# Patient Record
Sex: MALE | Race: WHITE | Employment: OTHER | URBAN - METROPOLITAN AREA
[De-identification: names, ages, dates, MRNs, and addresses within clinical notes are randomized per-mention and may not be internally consistent; named-entity substitution may affect disease eponyms.]

---

## 2017-01-19 ENCOUNTER — RT VISIT (OUTPATIENT)
Dept: RESPIRATORY THERAPY | Facility: HOSPITAL | Age: 67
End: 2017-01-19
Attending: INTERNAL MEDICINE
Payer: MEDICARE

## 2017-01-19 DIAGNOSIS — R06.00 DYSPNEA: ICD-10-CM

## 2017-01-19 DIAGNOSIS — R06.00 DYSPNEA, UNSPECIFIED TYPE: ICD-10-CM

## 2017-01-19 PROCEDURE — 94070 EVALUATION OF WHEEZING: CPT

## 2017-01-20 NOTE — PROCEDURES
Max Faulkner is a 43-year-old  male who stands 5 feet 5 inches tall and weighs 185 pounds. He underwent methacholine challenge testing on 1/19/17. He carries a diagnosis of dyspnea.   He admits to a 7-pack-year smoking history but stopped smoking 30

## 2017-01-25 PROBLEM — J45.909 ASTHMA: Status: ACTIVE | Noted: 2017-01-25

## 2017-01-25 NOTE — PROGRESS NOTES
Quick Note:    Spoke with patient, relayed results and recommendations per Dr Oneil Salmon, patient verbalized understanding. Patient is currently using Albuterol three times a day.  Instructed patient to increase Advair 500-50 1 puff BID, patient requested p

## 2017-01-25 NOTE — PROGRESS NOTES
Quick Note:    Please let pt know that his breathing test confirmed that he has Asthma. He should continue the Advair, Albuterol, and Singulair as he has been.   If he notes more frequent use of the Albuterol than 2-3 times a week he should call us to incr

## 2017-04-25 PROCEDURE — 86003 ALLG SPEC IGE CRUDE XTRC EA: CPT | Performed by: INTERNAL MEDICINE

## 2017-04-25 PROCEDURE — 36415 COLL VENOUS BLD VENIPUNCTURE: CPT | Performed by: INTERNAL MEDICINE

## 2017-08-30 PROBLEM — Z87.09 HISTORY OF CHRONIC SINUSITIS: Status: ACTIVE | Noted: 2017-08-30

## 2017-08-30 PROBLEM — J45.50: Status: ACTIVE | Noted: 2017-08-30

## 2017-11-28 PROBLEM — S93.491A SPRAIN OF OTHER LIGAMENT OF RIGHT ANKLE: Status: ACTIVE | Noted: 2017-11-28

## 2017-11-30 PROBLEM — M97.8XXA PERIPROSTHETIC FRACTURE OF PROXIMAL END OF FEMUR: Status: ACTIVE | Noted: 2017-11-30

## 2017-11-30 PROBLEM — Z96.649 PERIPROSTHETIC FRACTURE OF PROXIMAL END OF FEMUR: Status: ACTIVE | Noted: 2017-11-30

## 2017-12-05 PROBLEM — M87.051 AVASCULAR NECROSIS OF MEDIAL CONDYLE OF RIGHT FEMUR (HCC): Status: ACTIVE | Noted: 2017-12-05

## 2018-07-12 PROBLEM — J45.50: Status: RESOLVED | Noted: 2017-08-30 | Resolved: 2018-07-12

## 2018-07-12 PROBLEM — J45.50 SEVERE PERSISTENT ASTHMA WITHOUT COMPLICATION: Status: ACTIVE | Noted: 2018-07-12

## 2018-07-12 PROBLEM — J82.83 EOSINOPHILIC ASTHMA: Status: ACTIVE | Noted: 2018-07-12

## 2018-12-21 PROBLEM — J47.9 BRONCHIECTASIS WITHOUT COMPLICATION (HCC): Status: ACTIVE | Noted: 2018-12-21

## 2019-01-18 PROBLEM — Z96.649 PERIPROSTHETIC FRACTURE OF PROXIMAL END OF FEMUR: Status: RESOLVED | Noted: 2017-11-30 | Resolved: 2019-01-18

## 2019-01-18 PROBLEM — M97.8XXA PERIPROSTHETIC FRACTURE OF PROXIMAL END OF FEMUR: Status: RESOLVED | Noted: 2017-11-30 | Resolved: 2019-01-18

## 2019-10-17 ENCOUNTER — APPOINTMENT (OUTPATIENT)
Dept: MRI IMAGING | Facility: HOSPITAL | Age: 69
DRG: 519 | End: 2019-10-17
Attending: EMERGENCY MEDICINE
Payer: MEDICARE

## 2019-10-17 ENCOUNTER — HOSPITAL ENCOUNTER (INPATIENT)
Facility: HOSPITAL | Age: 69
LOS: 9 days | Discharge: NURSING, REHAB OR LONG-TERM CARE | DRG: 519 | End: 2019-10-26
Attending: EMERGENCY MEDICINE | Admitting: INTERNAL MEDICINE
Payer: MEDICARE

## 2019-10-17 ENCOUNTER — APPOINTMENT (OUTPATIENT)
Dept: GENERAL RADIOLOGY | Facility: HOSPITAL | Age: 69
DRG: 519 | End: 2019-10-17
Attending: EMERGENCY MEDICINE
Payer: MEDICARE

## 2019-10-17 DIAGNOSIS — S32.020A COMPRESSION FRACTURE OF L2 VERTEBRA, INITIAL ENCOUNTER (HCC): Primary | ICD-10-CM

## 2019-10-17 DIAGNOSIS — S32.030A COMPRESSION FRACTURE OF L3 VERTEBRA, INITIAL ENCOUNTER (HCC): ICD-10-CM

## 2019-10-17 PROCEDURE — 94640 AIRWAY INHALATION TREATMENT: CPT

## 2019-10-17 PROCEDURE — 96374 THER/PROPH/DIAG INJ IV PUSH: CPT

## 2019-10-17 PROCEDURE — 87999 UNLISTED MICROBIOLOGY PX: CPT

## 2019-10-17 PROCEDURE — 71045 X-RAY EXAM CHEST 1 VIEW: CPT | Performed by: EMERGENCY MEDICINE

## 2019-10-17 PROCEDURE — 96361 HYDRATE IV INFUSION ADD-ON: CPT

## 2019-10-17 PROCEDURE — 96375 TX/PRO/DX INJ NEW DRUG ADDON: CPT

## 2019-10-17 PROCEDURE — 93010 ELECTROCARDIOGRAM REPORT: CPT

## 2019-10-17 PROCEDURE — 85025 COMPLETE CBC W/AUTO DIFF WBC: CPT | Performed by: EMERGENCY MEDICINE

## 2019-10-17 PROCEDURE — 80053 COMPREHEN METABOLIC PANEL: CPT | Performed by: EMERGENCY MEDICINE

## 2019-10-17 PROCEDURE — 99285 EMERGENCY DEPT VISIT HI MDM: CPT

## 2019-10-17 PROCEDURE — 72148 MRI LUMBAR SPINE W/O DYE: CPT | Performed by: EMERGENCY MEDICINE

## 2019-10-17 PROCEDURE — 93005 ELECTROCARDIOGRAM TRACING: CPT

## 2019-10-17 PROCEDURE — 96376 TX/PRO/DX INJ SAME DRUG ADON: CPT

## 2019-10-17 RX ORDER — HYDROCODONE BITARTRATE AND ACETAMINOPHEN 5; 325 MG/1; MG/1
2 TABLET ORAL EVERY 4 HOURS PRN
Status: DISCONTINUED | OUTPATIENT
Start: 2019-10-17 | End: 2019-10-22

## 2019-10-17 RX ORDER — IPRATROPIUM BROMIDE AND ALBUTEROL SULFATE 2.5; .5 MG/3ML; MG/3ML
3 SOLUTION RESPIRATORY (INHALATION)
Status: DISCONTINUED | OUTPATIENT
Start: 2019-10-17 | End: 2019-10-23

## 2019-10-17 RX ORDER — MONTELUKAST SODIUM 10 MG/1
10 TABLET ORAL NIGHTLY
Status: DISCONTINUED | OUTPATIENT
Start: 2019-10-17 | End: 2019-10-26

## 2019-10-17 RX ORDER — MONTELUKAST SODIUM 10 MG/1
10 TABLET ORAL NIGHTLY
COMMUNITY

## 2019-10-17 RX ORDER — MULTIVITAMIN WITH FOLIC ACID 400 MCG
1 TABLET ORAL DAILY
COMMUNITY

## 2019-10-17 RX ORDER — ACETAMINOPHEN 325 MG/1
650 TABLET ORAL EVERY 4 HOURS PRN
Status: DISCONTINUED | OUTPATIENT
Start: 2019-10-17 | End: 2019-10-26

## 2019-10-17 RX ORDER — ONDANSETRON 2 MG/ML
4 INJECTION INTRAMUSCULAR; INTRAVENOUS ONCE
Status: COMPLETED | OUTPATIENT
Start: 2019-10-17 | End: 2019-10-17

## 2019-10-17 RX ORDER — ALBUTEROL SULFATE 90 UG/1
2 AEROSOL, METERED RESPIRATORY (INHALATION) EVERY 6 HOURS PRN
Status: DISCONTINUED | OUTPATIENT
Start: 2019-10-17 | End: 2019-10-26

## 2019-10-17 RX ORDER — IPRATROPIUM BROMIDE AND ALBUTEROL SULFATE 2.5; .5 MG/3ML; MG/3ML
3 SOLUTION RESPIRATORY (INHALATION) ONCE
Status: COMPLETED | OUTPATIENT
Start: 2019-10-17 | End: 2019-10-17

## 2019-10-17 RX ORDER — HYDROMORPHONE HYDROCHLORIDE 1 MG/ML
0.5 INJECTION, SOLUTION INTRAMUSCULAR; INTRAVENOUS; SUBCUTANEOUS EVERY 30 MIN PRN
Status: ACTIVE | OUTPATIENT
Start: 2019-10-17 | End: 2019-10-17

## 2019-10-17 RX ORDER — HYDROMORPHONE HYDROCHLORIDE 1 MG/ML
1 INJECTION, SOLUTION INTRAMUSCULAR; INTRAVENOUS; SUBCUTANEOUS ONCE
Status: COMPLETED | OUTPATIENT
Start: 2019-10-17 | End: 2019-10-17

## 2019-10-17 RX ORDER — METOCLOPRAMIDE HYDROCHLORIDE 5 MG/ML
10 INJECTION INTRAMUSCULAR; INTRAVENOUS EVERY 8 HOURS PRN
Status: DISCONTINUED | OUTPATIENT
Start: 2019-10-17 | End: 2019-10-26

## 2019-10-17 RX ORDER — NALOXONE HYDROCHLORIDE 0.4 MG/ML
0.08 INJECTION, SOLUTION INTRAMUSCULAR; INTRAVENOUS; SUBCUTANEOUS
Status: DISCONTINUED | OUTPATIENT
Start: 2019-10-17 | End: 2019-10-26

## 2019-10-17 RX ORDER — POTASSIUM CHLORIDE 20 MEQ/1
40 TABLET, EXTENDED RELEASE ORAL EVERY 4 HOURS
Status: COMPLETED | OUTPATIENT
Start: 2019-10-17 | End: 2019-10-18

## 2019-10-17 RX ORDER — HYDROMORPHONE HYDROCHLORIDE 1 MG/ML
0.4 INJECTION, SOLUTION INTRAMUSCULAR; INTRAVENOUS; SUBCUTANEOUS EVERY 2 HOUR PRN
Status: DISCONTINUED | OUTPATIENT
Start: 2019-10-17 | End: 2019-10-22

## 2019-10-17 RX ORDER — FEXOFENADINE HCL 180 MG/1
180 TABLET ORAL 2 TIMES DAILY
COMMUNITY

## 2019-10-17 RX ORDER — ONDANSETRON 2 MG/ML
4 INJECTION INTRAMUSCULAR; INTRAVENOUS EVERY 4 HOURS PRN
Status: DISCONTINUED | OUTPATIENT
Start: 2019-10-17 | End: 2019-10-23

## 2019-10-17 RX ORDER — HYDROMORPHONE HYDROCHLORIDE 1 MG/ML
1.2 INJECTION, SOLUTION INTRAMUSCULAR; INTRAVENOUS; SUBCUTANEOUS EVERY 2 HOUR PRN
Status: DISCONTINUED | OUTPATIENT
Start: 2019-10-17 | End: 2019-10-22

## 2019-10-17 RX ORDER — SODIUM CHLORIDE 9 MG/ML
INJECTION, SOLUTION INTRAVENOUS ONCE
Status: COMPLETED | OUTPATIENT
Start: 2019-10-17 | End: 2019-10-17

## 2019-10-17 RX ORDER — HEPARIN SODIUM 5000 [USP'U]/ML
5000 INJECTION, SOLUTION INTRAVENOUS; SUBCUTANEOUS EVERY 8 HOURS SCHEDULED
Status: DISCONTINUED | OUTPATIENT
Start: 2019-10-17 | End: 2019-10-22

## 2019-10-17 RX ORDER — SODIUM CHLORIDE 9 MG/ML
INJECTION, SOLUTION INTRAVENOUS CONTINUOUS
Status: ACTIVE | OUTPATIENT
Start: 2019-10-17 | End: 2019-10-17

## 2019-10-17 RX ORDER — FLUTICASONE PROPIONATE 50 MCG
2 SPRAY, SUSPENSION (ML) NASAL DAILY
Status: DISCONTINUED | OUTPATIENT
Start: 2019-10-18 | End: 2019-10-26

## 2019-10-17 RX ORDER — ONDANSETRON 2 MG/ML
4 INJECTION INTRAMUSCULAR; INTRAVENOUS EVERY 6 HOURS PRN
Status: DISCONTINUED | OUTPATIENT
Start: 2019-10-17 | End: 2019-10-26

## 2019-10-17 RX ORDER — ALBUTEROL SULFATE 2.5 MG/3ML
2.5 SOLUTION RESPIRATORY (INHALATION) DAILY PRN
Status: DISCONTINUED | OUTPATIENT
Start: 2019-10-17 | End: 2019-10-24

## 2019-10-17 RX ORDER — SENNOSIDES 8.6 MG
8.6 TABLET ORAL DAILY
Status: DISCONTINUED | OUTPATIENT
Start: 2019-10-17 | End: 2019-10-26

## 2019-10-17 RX ORDER — HYDROCODONE BITARTRATE AND ACETAMINOPHEN 5; 325 MG/1; MG/1
1 TABLET ORAL EVERY 4 HOURS PRN
Status: DISCONTINUED | OUTPATIENT
Start: 2019-10-17 | End: 2019-10-22

## 2019-10-17 RX ORDER — GUAIFENESIN AND DEXTROMETHORPHAN HYDROBROMIDE 1200; 60 MG/1; MG/1
1 TABLET, EXTENDED RELEASE ORAL EVERY 12 HOURS
COMMUNITY

## 2019-10-17 RX ORDER — SODIUM CHLORIDE 30 MG/ML INHALATION SOLUTION 30 MG/ML
4 SOLUTION INHALANT EVERY 12 HOURS
Status: DISCONTINUED | OUTPATIENT
Start: 2019-10-17 | End: 2019-10-20

## 2019-10-17 RX ORDER — DIPHENHYDRAMINE HCL 25 MG
25 CAPSULE ORAL EVERY 6 HOURS PRN
Status: DISCONTINUED | OUTPATIENT
Start: 2019-10-17 | End: 2019-10-23

## 2019-10-17 RX ORDER — FAMOTIDINE 20 MG/1
20 TABLET ORAL 2 TIMES DAILY
Status: DISCONTINUED | OUTPATIENT
Start: 2019-10-17 | End: 2019-10-26

## 2019-10-17 RX ORDER — HYDROMORPHONE HYDROCHLORIDE 1 MG/ML
1 INJECTION, SOLUTION INTRAMUSCULAR; INTRAVENOUS; SUBCUTANEOUS ONCE
Status: DISCONTINUED | OUTPATIENT
Start: 2019-10-17 | End: 2019-10-17

## 2019-10-17 RX ORDER — IPRATROPIUM BROMIDE AND ALBUTEROL SULFATE 2.5; .5 MG/3ML; MG/3ML
3 SOLUTION RESPIRATORY (INHALATION) EVERY 6 HOURS PRN
Status: DISCONTINUED | OUTPATIENT
Start: 2019-10-17 | End: 2019-10-26

## 2019-10-17 RX ORDER — FLUTICASONE PROPIONATE 50 MCG
1 SPRAY, SUSPENSION (ML) NASAL DAILY
Status: DISCONTINUED | OUTPATIENT
Start: 2019-10-18 | End: 2019-10-26

## 2019-10-17 RX ORDER — HYDROMORPHONE HYDROCHLORIDE 1 MG/ML
0.8 INJECTION, SOLUTION INTRAMUSCULAR; INTRAVENOUS; SUBCUTANEOUS EVERY 2 HOUR PRN
Status: DISCONTINUED | OUTPATIENT
Start: 2019-10-17 | End: 2019-10-22

## 2019-10-17 RX ORDER — CETIRIZINE HYDROCHLORIDE 10 MG/1
10 TABLET ORAL DAILY
Status: ON HOLD | COMMUNITY
End: 2019-10-17

## 2019-10-17 NOTE — ED INITIAL ASSESSMENT (HPI)
Pt states a fall x 2 days prior. Pt had xrays and confirms fx to lumbar area. Pt states came today due to pain to right hip.

## 2019-10-17 NOTE — H&P
[facility] Hospitalist Team  History and Physical       Assessment/Plan:     #Lumbar pain   -2/2 fall  -Xr shows mod/severe compression fx  -MRI is pending, consult spine once resulted  -pain control with iv dilaudid  -PT/OT eval    #Asthma/bronchiectasis  -cont Chantelle Francis MD at [address]        ALL:    Iodine (Topical)        ANAPHYLAXIS  Prednisone              OTHER (SEE COMMENTS)    Comment:Avascular necrosis  Radiology Contrast *    HIVES     Montelukast Sodium 10 MG Oral Tab, Take 10 mg by mouth nightly. Hx  Family History   Problem Relation Age of Onset   • Heart Attack Father         tobacco - 2 cartons weekly   • Other (Other) Mother         osteoporosis   • Cancer Brother         colon       Review of Systems   CONSTITUTIONAL:  As per HPI.   HEENT:  Eye 10/17/2019    CREATSERUM 0.97 10/17/2019    BUN 19 10/17/2019     10/17/2019    K 3.6 10/17/2019     10/17/2019    CO2 32.0 10/17/2019     10/17/2019    CA 9.6 10/17/2019    ALB 3.5 10/17/2019    ALKPHO 78 10/17/2019    BILT 1.0 10/17/20 be determined at this time. However, given patient's history of fall with subsequent low back pain, an acute or otherwise recent process is a likely consideration. Consider correlation with cross-sectional imaging study for further characterization.  No oth

## 2019-10-17 NOTE — ED PROVIDER NOTES
Patient Seen in: [facility] Emergency Department      History   Patient presents with:  Fall (musculoskeletal, neurologic)  Pain (neurologic)    Stated Complaint: Back pain, sent from Spine MD office    HPI    Patient is a 50-year-old male who prese History    Tobacco Use      Smoking status: Former Smoker        Packs/day: 1.50        Years: 7.00        Pack years: 10.5        Types: Cigarettes        Start date: 1967        Quit date: 1973        Years since quittin.7      Smokeless t (*)     HCT 56.0 (*)     RDW-SD 49.1 (*)     Neutrophil Absolute Prelim 9.12 (*)     Neutrophil Absolute 9.12 (*)     Lymphocyte Absolute 0.92 (*)     Monocyte Absolute 1.40 (*)     Eosinophil Absolute 1.00 (*)     All other components within normal limits Plan     Clinical Impression:  Compression fracture of L2 vertebra, initial encounter ([facility] [address]  (primary encounter diagnosis)    Disposition:  There is no disposition on file for this visit. There is no disposition time on file for this visit.     Follow-up:

## 2019-10-17 NOTE — ED NOTES
Patient states unable to sit in wheelchair for any period of time. Placed in Triage Holding area awaiting Triage Assessment. Patient's wife requested water from volunteer so she may give him pain meds.  Patient and wife informed that the patient needs to se

## 2019-10-18 PROCEDURE — 94640 AIRWAY INHALATION TREATMENT: CPT

## 2019-10-18 PROCEDURE — 84132 ASSAY OF SERUM POTASSIUM: CPT | Performed by: INTERNAL MEDICINE

## 2019-10-18 RX ORDER — CALCIUM CARBONATE 200(500)MG
500 TABLET,CHEWABLE ORAL EVERY 6 HOURS PRN
Status: DISCONTINUED | OUTPATIENT
Start: 2019-10-18 | End: 2019-10-26

## 2019-10-18 RX ORDER — GUAIFENESIN 600 MG
600 TABLET, EXTENDED RELEASE 12 HR ORAL EVERY 6 HOURS
Status: DISCONTINUED | OUTPATIENT
Start: 2019-10-18 | End: 2019-10-25

## 2019-10-18 NOTE — PLAN OF CARE
Spoke with Dr Miguel A Lee who said to put interventionalist on consult for kyphoplasty. Dr Denney Primrose. Will follow up.

## 2019-10-18 NOTE — PROGRESS NOTES
NURSING ADMISSION NOTE      Patient admitted via ER bed. Oriented to room. Safety precautions initiated. Bed in low position. Call light in reach. Admission database complete.

## 2019-10-18 NOTE — PROGRESS NOTES
NURSING ADMISSION NOTE      Patient admitted via cart at 0. Oriented to room. Safety precautions initiated. Bed in low position. Call light in reach. Patient admitted with pain to right hip s/p fall with L2 compression fx.   ED spoke with

## 2019-10-18 NOTE — PROGRESS NOTES
10/18/19 0803   Clinical Encounter Type   Visited With Patient and family together;Health care provider  (RN [facility])   Routine Visit   (Responded to POLST consult)   Continue Visiting   (Empowered the patient to call  anytime for further suppo

## 2019-10-18 NOTE — PROGRESS NOTES
[facility] Hospitalist Team  Progress Note      Aly Maki  12/6/1950    Assessment/Plan:       #Lumbar pain   -2/2 fall  -Xr shows mod/severe compression fx  -MRI shows acute F2 fracture with minimal retropulsion  -consult spine rec eval for kyphoplasty discuss Transdermal Nightly   • Fluticasone Propionate  1 spray Each Nare Daily   • Montelukast Sodium  10 mg Oral Nightly   • sodium chloride  4 mL Nebulization Q12H   • Umeclidinium Bromide  1 puff Inhalation Daily   • Fluticasone Propionate  2 spray Each Nare D

## 2019-10-18 NOTE — PHYSICAL THERAPY NOTE
PT order received, chart reviewed. Pt with L2 fx, pending ortho spine consult and possible intervention. Per chart, pt with severe pain with all weight bearing. Will hold PT eval under medical POC progressed.

## 2019-10-18 NOTE — OCCUPATIONAL THERAPY NOTE
OT orders received and chart reviewed. Pt reports severe pain and unable to bear weight through spine due to pain. Spine consult pending. D/w RN and will hold eval for pt pending spine consult. OT will continue to follow pt.

## 2019-10-19 PROCEDURE — 94640 AIRWAY INHALATION TREATMENT: CPT

## 2019-10-19 PROCEDURE — 93010 ELECTROCARDIOGRAM REPORT: CPT | Performed by: INTERNAL MEDICINE

## 2019-10-19 PROCEDURE — 93005 ELECTROCARDIOGRAM TRACING: CPT

## 2019-10-19 PROCEDURE — 94664 DEMO&/EVAL PT USE INHALER: CPT

## 2019-10-19 NOTE — PLAN OF CARE
A&Ox4  2L O2  Wheezing, scheduled nebs  Tums PRN  Back pain that radiates to right hip, PRN norco  TLSO brace  Lidocaine patch to lower back  SL  No tele  Plan for kyphoplasty Monday per report   Staff will continue to monitor      Problem: Patient/Family activity level and limitations with patient/family  Outcome: Progressing     Problem: Impaired Functional Mobility  Goal: Achieve highest/safest level of mobility/gait  Description  Interventions:  - Assess patient's functional ability and stability  - Pro

## 2019-10-19 NOTE — CONSULTS
Patient with burst fracture L2, posterior wall involvement. Patient with inability to ambulate and severe back pain. Not candidate for vertebral cement augmentation due to contrast allergy and posterior wall involvement.      Plan:  - Plan for surgical stab

## 2019-10-19 NOTE — PROGRESS NOTES
[facility] Hospitalist Team  Progress Note      Brandy Maki  12/6/1950    Assessment/Plan:       #Lumbar pain   -2/2 fall  -Xr shows mod/severe compression fx  -MRI shows acute F2 fracture with minimal retropulsion  -consult spine rec eval for kyphoplasty discuss lidocaine-menthol  1 patch Transdermal Nightly   • Fluticasone Propionate  1 spray Each Nare Daily   • Montelukast Sodium  10 mg Oral Nightly   • sodium chloride  4 mL Nebulization Q12H   • Umeclidinium Bromide  1 puff Inhalation Daily   • Fluticasone Prop

## 2019-10-19 NOTE — RESPIRATORY THERAPY NOTE
Received pt on 2L NC. Breath sound diminished/wheeze. Neb Tx given with Duoneb Q4 and 3% Nacl Q12. Will continue to monitor pt.

## 2019-10-20 PROCEDURE — 84145 PROCALCITONIN (PCT): CPT | Performed by: INTERNAL MEDICINE

## 2019-10-20 PROCEDURE — 94640 AIRWAY INHALATION TREATMENT: CPT

## 2019-10-20 RX ORDER — DOCUSATE SODIUM 100 MG/1
100 CAPSULE, LIQUID FILLED ORAL 2 TIMES DAILY
Status: DISCONTINUED | OUTPATIENT
Start: 2019-10-20 | End: 2019-10-23

## 2019-10-20 RX ORDER — ARFORMOTEROL TARTRATE 15 UG/2ML
15 SOLUTION RESPIRATORY (INHALATION)
Status: DISCONTINUED | OUTPATIENT
Start: 2019-10-20 | End: 2019-10-20

## 2019-10-20 RX ORDER — BUDESONIDE 0.5 MG/2ML
0.5 INHALANT ORAL
Status: DISCONTINUED | OUTPATIENT
Start: 2019-10-21 | End: 2019-10-20

## 2019-10-20 RX ORDER — SODIUM CHLORIDE 30 MG/ML INHALATION SOLUTION 30 MG/ML
4 SOLUTION INHALANT EVERY 12 HOURS
Status: DISCONTINUED | OUTPATIENT
Start: 2019-10-20 | End: 2019-10-26

## 2019-10-20 RX ORDER — BUDESONIDE 0.5 MG/2ML
0.5 INHALANT ORAL
Status: DISCONTINUED | OUTPATIENT
Start: 2019-10-20 | End: 2019-10-26

## 2019-10-20 RX ORDER — ARFORMOTEROL TARTRATE 15 UG/2ML
15 SOLUTION RESPIRATORY (INHALATION)
Status: DISCONTINUED | OUTPATIENT
Start: 2019-10-20 | End: 2019-10-26

## 2019-10-20 RX ORDER — BUDESONIDE 0.5 MG/2ML
0.5 INHALANT ORAL
Status: DISCONTINUED | OUTPATIENT
Start: 2019-10-20 | End: 2019-10-20

## 2019-10-20 NOTE — PLAN OF CARE
A&Ox4. VSS. 2L O2. Wheezing, scheduled nebs Q4. No tele. Norco PRN for pain. Lidocaine patch to lower back. TLSO brace. Saline locked. Hep subq. Plan for surgery on Monday. No further needs. Staff will continue to monitor.        Problem: Patient/Family [facility] level and limitations with patient/family  Outcome: Progressing     Problem: Impaired Functional Mobility  Goal: Achieve highest/safest level of mobility/gait  Description  Interventions:  - Assess patient's functional ability and stability  - Promote incr

## 2019-10-20 NOTE — PHYSICAL THERAPY NOTE
PT orders received, chart reviewed. Plan is for surgery on Monday , 10/21/19. Therefore will hold PT eval. Pt will NNO after surgery, with activity orders specified. RN aware.  CM

## 2019-10-20 NOTE — PROGRESS NOTES
[facility] Hospitalist Team  Progress Note      Stephen Maki  12/6/1950    Assessment/Plan:       #Lumbar pain   -2/2 fall  -Xr shows mod/severe compression fx  -MRI shows acute F2 fracture with minimal retropulsion  -consult spine rec eval for kyphoplasty discuss ipratropium-albuterol  3 mL Nebulization 6 times per day   • Heparin Sodium (Porcine)  5,000 Units Subcutaneous Q8H [address]   • lidocaine-menthol  1 patch Transdermal Nightly   • Fluticasone Propionate  1 spray Each Nare Daily   • Montelukast Sodium  10 mg Kimi Caba

## 2019-10-20 NOTE — CONSULTS
[place] Patient Status:  Inpatient    1950 MRN [id number]   [facility] 3NE-A Attending Lawanda Guerra, [address] [facility] Day # 3 PCP Carina Gilmore MD     Date of Admission: 10/17/2019 11:54 AM  Admission Diagnosis Surgical History:   Procedure Laterality Date   • ANESTH,SHOULDER REPLACEMENT      left   • COLONOSCOPY  4/9/13 - MICHELLE Phillips    adenoma, diverticuli, hemorrhoids, repeat 2018.     • HIP REPLACEMENT SURGERY      bilateral    • NASAL SEPTOPLASTY BILAT SINUS ENDOS 0  ibuprofen 600 MG Oral Tab, Take 1 tablet (600 mg total) by mouth every 8 (eight) hours as needed for Pain., Disp: 90 tablet, Rfl: 0  Albuterol Sulfate  (90 Base) MCG/ACT Inhalation Aero Soln, Inhale 2 puffs into the lungs every 6 (six) hours as n Q2H PRN **OR** HYDROmorphone HCl (DILAUDID) 1 MG/ML injection 1.2 mg, 1.2 mg, Intravenous, Q2H PRN  •  Naloxone HCl (NARCAN) 0.4 MG/ML injection 0.08 mg, 0.08 mg, Intravenous, Q5 Min PRN  •  ondansetron HCl (ZOFRAN) injection 4 mg, 4 mg, Intravenous, Q6H P numbness, weakness, ataxia, tremors, or vertigo  Psychiatric: denies insomnia, depression, anxiety, or drug abuse  Endocrine: denies polydypsia, polyuria, cold/heat intolerance  Hematologic/lymphatic: denies easy bruising, new blood clots, or lymphedema  A pneumonia, exacerbation of underlying asthma and potential need for prolonged mechanical ventilation. -will repeat CXR now to ensure there is no acute infectious process that needs to be addressed prior to surgery.   Procalcitonin and sputum culture also

## 2019-10-20 NOTE — PROGRESS NOTES
10/20/19 1217   Clinical Encounter Type   Visited With Patient   Routine Visit Follow-up   Continue Visiting No  (upon request or as needed)   Patient Spiritual Encounters   Spiritual Needs patient presents spiritual strength   Spiritual Interventions C

## 2019-10-20 NOTE — OCCUPATIONAL THERAPY NOTE
OT order received . Per ortho MD, pt plan for surgery tomorrow 10/21. Will hold until after sx. NNO after sx.

## 2019-10-21 PROCEDURE — 80048 BASIC METABOLIC PNL TOTAL CA: CPT | Performed by: INTERNAL MEDICINE

## 2019-10-21 PROCEDURE — 85025 COMPLETE CBC W/AUTO DIFF WBC: CPT | Performed by: INTERNAL MEDICINE

## 2019-10-21 PROCEDURE — 94640 AIRWAY INHALATION TREATMENT: CPT

## 2019-10-21 RX ORDER — POTASSIUM CHLORIDE 20 MEQ/1
40 TABLET, EXTENDED RELEASE ORAL ONCE
Status: COMPLETED | OUTPATIENT
Start: 2019-10-21 | End: 2019-10-21

## 2019-10-21 RX ORDER — HYDRALAZINE HYDROCHLORIDE 20 MG/ML
10 INJECTION INTRAMUSCULAR; INTRAVENOUS EVERY 6 HOURS PRN
Status: DISCONTINUED | OUTPATIENT
Start: 2019-10-21 | End: 2019-10-26

## 2019-10-21 NOTE — PLAN OF CARE
Patient alert and oriented x4, VS stable, RA, NC prn at night, no tele  Bilateral lobes wheezing  Anticipated surgery tomorrow afternoon  NPO at midnight  Pulmonology consult for surgery clearance and treatment   Bedrest  Sputum sample needed  Family updat

## 2019-10-21 NOTE — CM/SW NOTE
PT Rec is pending, but [facility] can not serve pt's address, will need another provider if PT rec is Jewell Goel

## 2019-10-21 NOTE — OCCUPATIONAL THERAPY NOTE
OCCUPATIONAL THERAPY             Patient scheduled for spine surgery today, will need new OT orders post-op.

## 2019-10-21 NOTE — PROGRESS NOTES
[facility] Hospitalist Progress Note     Isai Tiffany Patient Status:  Inpatient    1950 MRN [id number]   [facility] 3NE-A Attending Wilner Arzola DO   Hosp Day # 4 PCP Reinaldo Barraza MD     CC: follow up    SUBJECTIVE:  Warren Grant (Porcine)  5,000 Units Subcutaneous Q8H [facility]   • lidocaine-menthol  1 patch Transdermal Nightly   • Fluticasone Propionate  1 spray Each Nare Daily   • Montelukast Sodium  10 mg Oral Nightly   • Umeclidinium Bromide  1 puff Inhalation Daily   • Fluticasone in d/w pt/family, coordination of care, and/or d/w staff.      Ky Isaac MD   [facility] IM Hospitalist  Pager: [phone]

## 2019-10-21 NOTE — HOME CARE LIAISON
Referral from ALBINO España. Unfortunately [facility] is unable to service [place], [place]. Notified Toño Read.

## 2019-10-21 NOTE — PROGRESS NOTES
Pulmonary Progress Note        NAME: Mark Schmidt - ROOM: 5279/4343-Z - MRN: [id number] - Age: 80year old - : 1950        Last 24hrs: No events overnight, no complaints this AM    OBJECTIVE:   10/20/19  1946 10/21/19  0440 10/21/19  0748 10/21/19  0 16.2   MCV 96.1   .0       Recent Labs     10/21/19  0630      K 3.7      CO2 29.0   BUN 17   CA 9.7       No results for input(s): ALT, AST, ALB, AMYLASE, LIPASE, LDH in the last 72 hours.     Invalid input(s): ALPHOS, TBIL, DBIL, TPROT

## 2019-10-21 NOTE — PLAN OF CARE
Assumed care at [address]. Admitted for fall and L2 compression fracture. On bedrest.Scheduled for surgery tomorrow. Pt is A&O x4.   Hard of hearing. On RA, .  Bilateral wheezes on lung sounds, nebulizer treatments, inhaler, and scheduled medications ar

## 2019-10-22 ENCOUNTER — APPOINTMENT (OUTPATIENT)
Dept: GENERAL RADIOLOGY | Facility: HOSPITAL | Age: 69
DRG: 519 | End: 2019-10-22
Attending: ORTHOPAEDIC SURGERY
Payer: MEDICARE

## 2019-10-22 PROCEDURE — 94664 DEMO&/EVAL PT USE INHALER: CPT

## 2019-10-22 PROCEDURE — 95939 C MOTOR EVOKED UPR&LWR LIMBS: CPT | Performed by: ORTHOPAEDIC SURGERY

## 2019-10-22 PROCEDURE — 94640 AIRWAY INHALATION TREATMENT: CPT

## 2019-10-22 PROCEDURE — 85027 COMPLETE CBC AUTOMATED: CPT | Performed by: ORTHOPAEDIC SURGERY

## 2019-10-22 PROCEDURE — 76000 FLUOROSCOPY <1 HR PHYS/QHP: CPT | Performed by: ORTHOPAEDIC SURGERY

## 2019-10-22 PROCEDURE — 95940 IONM IN OPERATNG ROOM 15 MIN: CPT | Performed by: ORTHOPAEDIC SURGERY

## 2019-10-22 PROCEDURE — 0QS004Z REPOSITION LUMBAR VERTEBRA WITH INTERNAL FIXATION DEVICE, OPEN APPROACH: ICD-10-PCS | Performed by: ORTHOPAEDIC SURGERY

## 2019-10-22 PROCEDURE — 95938 SOMATOSENSORY TESTING: CPT | Performed by: ORTHOPAEDIC SURGERY

## 2019-10-22 PROCEDURE — 84132 ASSAY OF SERUM POTASSIUM: CPT | Performed by: INTERNAL MEDICINE

## 2019-10-22 PROCEDURE — 95861 NEEDLE EMG 2 EXTREMITIES: CPT | Performed by: ORTHOPAEDIC SURGERY

## 2019-10-22 DEVICE — SCREW SET SPNE VRST GRY: Type: IMPLANTABLE DEVICE | Site: BACK | Status: FUNCTIONAL

## 2019-10-22 DEVICE — IMPLANTABLE DEVICE: Type: IMPLANTABLE DEVICE | Site: BACK | Status: FUNCTIONAL

## 2019-10-22 RX ORDER — MORPHINE SULFATE 4 MG/ML
4 INJECTION, SOLUTION INTRAMUSCULAR; INTRAVENOUS EVERY 2 HOUR PRN
Status: DISCONTINUED | OUTPATIENT
Start: 2019-10-22 | End: 2019-10-26

## 2019-10-22 RX ORDER — TIZANIDINE 2 MG/1
2 TABLET ORAL 3 TIMES DAILY PRN
Status: DISCONTINUED | OUTPATIENT
Start: 2019-10-22 | End: 2019-10-26

## 2019-10-22 RX ORDER — MORPHINE SULFATE 4 MG/ML
2 INJECTION, SOLUTION INTRAMUSCULAR; INTRAVENOUS EVERY 2 HOUR PRN
Status: DISCONTINUED | OUTPATIENT
Start: 2019-10-22 | End: 2019-10-26

## 2019-10-22 RX ORDER — BUPIVACAINE HYDROCHLORIDE 2.5 MG/ML
INJECTION, SOLUTION EPIDURAL; INFILTRATION; INTRACAUDAL AS NEEDED
Status: DISCONTINUED | OUTPATIENT
Start: 2019-10-22 | End: 2019-10-22 | Stop reason: HOSPADM

## 2019-10-22 RX ORDER — DIPHENHYDRAMINE HYDROCHLORIDE 50 MG/ML
25 INJECTION INTRAMUSCULAR; INTRAVENOUS EVERY 4 HOURS PRN
Status: DISCONTINUED | OUTPATIENT
Start: 2019-10-22 | End: 2019-10-26

## 2019-10-22 RX ORDER — DEXAMETHASONE SODIUM PHOSPHATE 4 MG/ML
4 VIAL (ML) INJECTION AS NEEDED
Status: DISCONTINUED | OUTPATIENT
Start: 2019-10-22 | End: 2019-10-22 | Stop reason: HOSPADM

## 2019-10-22 RX ORDER — SODIUM PHOSPHATE, DIBASIC AND SODIUM PHOSPHATE, MONOBASIC 7; 19 G/133ML; G/133ML
1 ENEMA RECTAL ONCE AS NEEDED
Status: COMPLETED | OUTPATIENT
Start: 2019-10-22 | End: 2019-10-25

## 2019-10-22 RX ORDER — MIDAZOLAM HYDROCHLORIDE 1 MG/ML
INJECTION INTRAMUSCULAR; INTRAVENOUS
Status: COMPLETED
Start: 2019-10-22 | End: 2019-10-22

## 2019-10-22 RX ORDER — HYDROCODONE BITARTRATE AND ACETAMINOPHEN 5; 325 MG/1; MG/1
2 TABLET ORAL AS NEEDED
Status: DISCONTINUED | OUTPATIENT
Start: 2019-10-22 | End: 2019-10-22 | Stop reason: HOSPADM

## 2019-10-22 RX ORDER — BISACODYL 10 MG
10 SUPPOSITORY, RECTAL RECTAL
Status: DISCONTINUED | OUTPATIENT
Start: 2019-10-22 | End: 2019-10-26

## 2019-10-22 RX ORDER — SODIUM CHLORIDE, SODIUM LACTATE, POTASSIUM CHLORIDE, CALCIUM CHLORIDE 600; 310; 30; 20 MG/100ML; MG/100ML; MG/100ML; MG/100ML
INJECTION, SOLUTION INTRAVENOUS CONTINUOUS
Status: DISCONTINUED | OUTPATIENT
Start: 2019-10-22 | End: 2019-10-22 | Stop reason: HOSPADM

## 2019-10-22 RX ORDER — PREGABALIN 50 MG/1
50 CAPSULE ORAL 2 TIMES DAILY
Status: DISCONTINUED | OUTPATIENT
Start: 2019-10-22 | End: 2019-10-26

## 2019-10-22 RX ORDER — METOCLOPRAMIDE HYDROCHLORIDE 5 MG/ML
10 INJECTION INTRAMUSCULAR; INTRAVENOUS AS NEEDED
Status: DISCONTINUED | OUTPATIENT
Start: 2019-10-22 | End: 2019-10-22 | Stop reason: HOSPADM

## 2019-10-22 RX ORDER — ONDANSETRON 2 MG/ML
4 INJECTION INTRAMUSCULAR; INTRAVENOUS EVERY 4 HOURS PRN
Status: ACTIVE | OUTPATIENT
Start: 2019-10-22 | End: 2019-10-23

## 2019-10-22 RX ORDER — SENNOSIDES 8.6 MG
17.2 TABLET ORAL NIGHTLY
Status: DISCONTINUED | OUTPATIENT
Start: 2019-10-22 | End: 2019-10-26

## 2019-10-22 RX ORDER — DOCUSATE SODIUM 100 MG/1
100 CAPSULE, LIQUID FILLED ORAL 2 TIMES DAILY
Status: DISCONTINUED | OUTPATIENT
Start: 2019-10-22 | End: 2019-10-26

## 2019-10-22 RX ORDER — NALOXONE HYDROCHLORIDE 0.4 MG/ML
80 INJECTION, SOLUTION INTRAMUSCULAR; INTRAVENOUS; SUBCUTANEOUS AS NEEDED
Status: DISCONTINUED | OUTPATIENT
Start: 2019-10-22 | End: 2019-10-22 | Stop reason: HOSPADM

## 2019-10-22 RX ORDER — DIPHENHYDRAMINE HCL 25 MG
25 CAPSULE ORAL EVERY 4 HOURS PRN
Status: DISCONTINUED | OUTPATIENT
Start: 2019-10-22 | End: 2019-10-26

## 2019-10-22 RX ORDER — ZOLPIDEM TARTRATE 5 MG/1
5 TABLET ORAL NIGHTLY PRN
Status: DISCONTINUED | OUTPATIENT
Start: 2019-10-22 | End: 2019-10-26

## 2019-10-22 RX ORDER — POLYETHYLENE GLYCOL 3350 17 G/17G
17 POWDER, FOR SOLUTION ORAL DAILY PRN
Status: DISCONTINUED | OUTPATIENT
Start: 2019-10-22 | End: 2019-10-26

## 2019-10-22 RX ORDER — LABETALOL HYDROCHLORIDE 5 MG/ML
5 INJECTION, SOLUTION INTRAVENOUS EVERY 5 MIN PRN
Status: DISCONTINUED | OUTPATIENT
Start: 2019-10-22 | End: 2019-10-22 | Stop reason: HOSPADM

## 2019-10-22 RX ORDER — VANCOMYCIN HYDROCHLORIDE 1 G/20ML
INJECTION, POWDER, LYOPHILIZED, FOR SOLUTION INTRAVENOUS AS NEEDED
Status: DISCONTINUED | OUTPATIENT
Start: 2019-10-22 | End: 2019-10-22 | Stop reason: HOSPADM

## 2019-10-22 RX ORDER — ONDANSETRON 2 MG/ML
4 INJECTION INTRAMUSCULAR; INTRAVENOUS AS NEEDED
Status: DISCONTINUED | OUTPATIENT
Start: 2019-10-22 | End: 2019-10-22 | Stop reason: HOSPADM

## 2019-10-22 RX ORDER — MEPERIDINE HYDROCHLORIDE 25 MG/ML
12.5 INJECTION INTRAMUSCULAR; INTRAVENOUS; SUBCUTANEOUS
Status: DISCONTINUED | OUTPATIENT
Start: 2019-10-22 | End: 2019-10-22 | Stop reason: HOSPADM

## 2019-10-22 RX ORDER — ALBUTEROL SULFATE 2.5 MG/3ML
2.5 SOLUTION RESPIRATORY (INHALATION) ONCE
Status: COMPLETED | OUTPATIENT
Start: 2019-10-22 | End: 2019-10-22

## 2019-10-22 RX ORDER — IBUPROFEN 600 MG/1
600 TABLET ORAL ONCE AS NEEDED
Status: DISCONTINUED | OUTPATIENT
Start: 2019-10-22 | End: 2019-10-22 | Stop reason: HOSPADM

## 2019-10-22 RX ORDER — MORPHINE SULFATE 15 MG/1
15 TABLET ORAL EVERY 6 HOURS
Status: DISCONTINUED | OUTPATIENT
Start: 2019-10-22 | End: 2019-10-23

## 2019-10-22 RX ORDER — CEFAZOLIN SODIUM/WATER 2 G/20 ML
2 SYRINGE (ML) INTRAVENOUS EVERY 8 HOURS
Status: COMPLETED | OUTPATIENT
Start: 2019-10-23 | End: 2019-10-23

## 2019-10-22 RX ORDER — OXYCODONE HYDROCHLORIDE 5 MG/1
5 TABLET ORAL EVERY 6 HOURS
Status: DISCONTINUED | OUTPATIENT
Start: 2019-10-22 | End: 2019-10-23

## 2019-10-22 RX ORDER — MEPERIDINE HYDROCHLORIDE 25 MG/ML
INJECTION INTRAMUSCULAR; INTRAVENOUS; SUBCUTANEOUS
Status: COMPLETED
Start: 2019-10-22 | End: 2019-10-22

## 2019-10-22 RX ORDER — HYDROCODONE BITARTRATE AND ACETAMINOPHEN 5; 325 MG/1; MG/1
1 TABLET ORAL AS NEEDED
Status: DISCONTINUED | OUTPATIENT
Start: 2019-10-22 | End: 2019-10-22 | Stop reason: HOSPADM

## 2019-10-22 RX ORDER — METOCLOPRAMIDE HYDROCHLORIDE 5 MG/ML
10 INJECTION INTRAMUSCULAR; INTRAVENOUS EVERY 6 HOURS PRN
Status: DISCONTINUED | OUTPATIENT
Start: 2019-10-22 | End: 2019-10-26

## 2019-10-22 RX ORDER — HYDROMORPHONE HYDROCHLORIDE 1 MG/ML
0.4 INJECTION, SOLUTION INTRAMUSCULAR; INTRAVENOUS; SUBCUTANEOUS EVERY 5 MIN PRN
Status: DISCONTINUED | OUTPATIENT
Start: 2019-10-22 | End: 2019-10-22 | Stop reason: HOSPADM

## 2019-10-22 RX ORDER — MORPHINE SULFATE 4 MG/ML
1 INJECTION, SOLUTION INTRAMUSCULAR; INTRAVENOUS EVERY 2 HOUR PRN
Status: DISCONTINUED | OUTPATIENT
Start: 2019-10-22 | End: 2019-10-26

## 2019-10-22 RX ORDER — MIDAZOLAM HYDROCHLORIDE 1 MG/ML
1 INJECTION INTRAMUSCULAR; INTRAVENOUS EVERY 5 MIN PRN
Status: COMPLETED | OUTPATIENT
Start: 2019-10-22 | End: 2019-10-22

## 2019-10-22 RX ORDER — HYDROMORPHONE HYDROCHLORIDE 1 MG/ML
INJECTION, SOLUTION INTRAMUSCULAR; INTRAVENOUS; SUBCUTANEOUS
Status: COMPLETED
Start: 2019-10-22 | End: 2019-10-22

## 2019-10-22 NOTE — PLAN OF CARE
Assumed care of patient at [address] [place] [address]. Patient A&Ox4. On RA during the day, 2L HS. . Scheduled nebulizers, inhaler, & flonase. Rescue inhaler PRN. Expiratory wheeze & mild crackles noted. No telemetry. C/O pain to R hip 8/10. Dilaudid PRN per MAR.   NPO in p ambulating w/ or w/o assistive devices  - Assist with transfers and ambulation using safe patient handling equipment as needed  - Ensure adequate protection for wounds/incisions during mobilization  - Obtain PT/OT consults as needed  - Advance activity as

## 2019-10-22 NOTE — PROGRESS NOTES
Pulmonary Progress Note        NAME: Dia Bachelor - ROOM: 1875/2423-Z - MRN: [id number] - Age: 80year old - : 1950        Last 24hrs: noted to desaturate a few times overnight, no complaints this AM    OBJECTIVE:   10/21/19  2246 10/21/19  9047 10/2 3.7      CO2 29.0   BUN 17   CA 9.7       No results for input(s): ALT, AST, ALB, AMYLASE, LIPASE, LDH in the last 72 hours.     Invalid input(s): ALPHOS, TBIL, DBIL, TPROT    Invalid input(s): ARTERIALPH, ARTERIALPO2, ARTERIALPCO2, ARTERIALHCO3    No

## 2019-10-22 NOTE — PROGRESS NOTES
[facility] Hospitalist Progress Note     [place] Patient Status:  Inpatient    1950 MRN [id number]   [facility] 3NE-A Attending Getachew Andrade, DO   Hosp Day # 5 PCP Jorge L Greenwood MD     CC: follow up    SUBJECTIVE:  No complaint Transdermal Nightly   • Fluticasone Propionate  1 spray Each Nare Daily   • Montelukast Sodium  10 mg Oral Nightly   • Umeclidinium Bromide  1 puff Inhalation Daily   • Fluticasone Propionate  2 spray Each Nare Daily   • Senna  8.6 mg Oral Daily   • famoTI [phone]

## 2019-10-22 NOTE — RESPIRATORY THERAPY NOTE
Patient received on RA. All vital signs stable. bs exp wheezes. Neb treatments given as ordered.  Will cont to monitor closely

## 2019-10-22 NOTE — PLAN OF CARE
Patient is A&O x4.   Calm and cooperative. VSS. On RA. IV came out, new IV inserted-pt tolerated well. C/o R hip pain, norco and dilaudid prn given. Electrolyte protocol. Potassium replaced, redraw in AM.  Plan for sx today at 1130.  Consent signed a devices  - Assist with transfers and ambulation using safe patient handling equipment as needed  - Ensure adequate protection for wounds/incisions during mobilization  - Obtain PT/OT consults as needed  - Advance activity as appropriate  - Communicate orde

## 2019-10-22 NOTE — PLAN OF CARE
Patient alert x4. C/O pain and medication given. Planned surgery tomorrow 10/22, consent signed. RA, no tele, patient was told he will go to ICU post surgery.   Problem: RESPIRATORY - ADULT  Goal: Achieves optimal ventilation and oxygenation  Description

## 2019-10-22 NOTE — BRIEF OP NOTE
Pre-Operative Diagnosis: Compression fracture of L3 vertebra, initial encounter ([facility] [address] [S32.030A]     Post-Operative Diagnosis: Compression fracture of L3 vertebra, initial encounter ([facility] [address] [S32.030A]      Procedure Performed:   Procedure(s):  Lumbar 1 - Lum

## 2019-10-22 NOTE — PROGRESS NOTES
Patient in good spirits. Further discussed risks and benefits of operative vs non operative stabilization with patient. Patient is non ambulatory due to back pain at this time. Plan would be spinal stabilization around fracture site.  Patient verbalized und

## 2019-10-23 PROCEDURE — 97530 THERAPEUTIC ACTIVITIES: CPT

## 2019-10-23 PROCEDURE — 94640 AIRWAY INHALATION TREATMENT: CPT

## 2019-10-23 PROCEDURE — 97162 PT EVAL MOD COMPLEX 30 MIN: CPT

## 2019-10-23 PROCEDURE — 85027 COMPLETE CBC AUTOMATED: CPT | Performed by: INTERNAL MEDICINE

## 2019-10-23 PROCEDURE — 97165 OT EVAL LOW COMPLEX 30 MIN: CPT

## 2019-10-23 RX ORDER — IPRATROPIUM BROMIDE AND ALBUTEROL SULFATE 2.5; .5 MG/3ML; MG/3ML
3 SOLUTION RESPIRATORY (INHALATION)
Status: DISCONTINUED | OUTPATIENT
Start: 2019-10-23 | End: 2019-10-24

## 2019-10-23 RX ORDER — MORPHINE SULFATE 15 MG/1
15 TABLET ORAL EVERY 6 HOURS
Status: DISCONTINUED | OUTPATIENT
Start: 2019-10-24 | End: 2019-10-24

## 2019-10-23 RX ORDER — OXYCODONE HYDROCHLORIDE 5 MG/1
5 TABLET ORAL EVERY 6 HOURS
Status: DISCONTINUED | OUTPATIENT
Start: 2019-10-24 | End: 2019-10-24

## 2019-10-23 NOTE — PLAN OF CARE
Complains of Rt hip pain , X-rays done on admission=neg for fracture, spine x-rays ordered but needs to be able to stand, unable to tolerate being upright , x-ray notified, will try in am, updated on plan of care, denies abd pain at this time,tolerating di

## 2019-10-23 NOTE — PROGRESS NOTES
10/23/19 1716   Clinical Encounter Type   Visited With Health care provider  (Nurse Nae Hawk wanted me to check on POLST but as I checked the chart say that the POLST was not signed by the doctor.  I told the nurse to call the  when the doctor had sig

## 2019-10-23 NOTE — PROGRESS NOTES
10/23/19 1031   Clinical Encounter Type   Visited With   ( provided follow up on completion of POLST form.  )   Referral To Physician;Nurse  (Please page a  at 2000 after physician has signed the POLST form - to scan the signed POLST for

## 2019-10-23 NOTE — PLAN OF CARE
RECEIVED PATIENT ALERT AND ORIENTED X4. VSS ON 4L O2. PATIENT ORIENTED TO FLOOR FROM PACU. MD DR Shyann KEBEDE AT [facility] NOTIFIED FOR ORDERS. RECEIVED AND CARRIED OUT. INTERMITTENT EXP WHEEZING NOTED. DENIES CHEST PAIN WT THIS TIME.   OXY/MOR

## 2019-10-23 NOTE — PHYSICAL THERAPY NOTE
PHYSICAL THERAPY EVALUATION - INPATIENT     Room Number: 420/546-Q  Evaluation Date: 10/23/2019  Type of Evaluation: Initial  Physician Order: PT Eval and Treat    Presenting Problem: S/p L1-L2,L2-L3 Stabilization, Open treatment of L2 Compression Fx diverticuli, hemorrhoids, repeat 2018.     • HIP REPLACEMENT SURGERY      bilateral    • NASAL SEPTOPLASTY BILAT SINUS ENDOSCOPY ETHM MAX Bilateral 10/25/2016    Performed by Justus Melendez MD at [address]   • [address] [facility] ENT N/A 10/25/2016    Performe O2 WALK                  AM-PAC '6-Clicks' INPATIENT SHORT FORM - BASIC MOBILITY  How much difficulty does the patient currently have. ..  -   Turning over in bed (including adjusting bedclothes, sheets and blankets)?: A Lot   -   Sitting do training    Patient End of Session: In bed;Needs met;Call light within reach;RN aware of session/findings;Bracing education provided; All patient questions and concerns addressed;SCDs in place; Alarm set; Discussed recommendations with /social wor training;Neuromuscular re-educate;Strengthening;Stoop training;Stair training;Transfer training;Balance training  Rehab Potential : Good  Frequency (Obs): Daily  Number of Visits to Meet Established Goals: 5      CURRENT GOALS    Goal #1 Patient is able to

## 2019-10-23 NOTE — PROGRESS NOTES
[facility] Hospitalist Progress Note     A[place] Postal Patient Status:  Inpatient    1950 MRN [id number]   [facility] 3NE-A Attending Bernardo Dahl, DO   Hosp Day # 6 PCP Ramandeep Rios MD     CC: follow up    SUBJECTIVE:  Having hip p ipratropium-albuterol  3 mL Nebulization Q4H WA (5 times daily)   • pregabalin  50 mg Oral BID   • Senna  17.2 mg Oral Nightly   • docusate sodium  100 mg Oral BID   • oxyCODONE HCl  5 mg Oral Q6H    Or   • morphINE Sulfate IR  15 mg Oral Q6H   • sodium ch risk.      # Severe persistent asthma  # Bronchiectasis  - pulm consulted, apprec recs  - cont home asthma meds  - mucinex, hypertonic saline, flutter valve  - CXR with possible infiltrate, though PCT low     # Elevated BP  - no known hx HTN  - PRN hydrala

## 2019-10-23 NOTE — PLAN OF CARE
Discussed plan of care, Scheduled & Prn meds discussed , seen by PT/OT ,(see notes), d/c plan to AUDI , Lung sounds diminished, non-prod cough noted, nebs as scheduled,weaned to 1 liter O2, Tums given x 1, repositioned for comfort.

## 2019-10-23 NOTE — PROGRESS NOTES
Pulmonary Progress Note        NAME: Ansley Hassan - ROOM: 2752/3090-P - MRN: [id number] - Age: 80year old - : 1950        Last 24hrs: had surgery yesterday, tolerated it well, noticing persistent/increased right hip pain, not sleeping well    OBJECT regular rate and rhythm  Abdomen: soft, non-tender; bowel sounds normal; no masses,  no organomegaly  Extremities: extremities normal, atraumatic, no cyanosis or edema      Recent Labs     10/21/19  0630   WBC 9.1   HGB 16.2   MCV 96.1   .0       Re

## 2019-10-23 NOTE — PROGRESS NOTES
Spine Service Progress Note    24hrs/Events:   None    Subjective:   Patient with pain at incision site and cont pain in right hip but improving.  Hip pain likely contusion    Objective:   /89 (BP Location: Right arm)   Pulse 86   Temp 99.4 °F (37 -Anticoagulation: Moderate risk for DVT/PE. Defer to mechanical DVT ppx given recent history of spine surgery and risk for formation of epidural hematoma. Early mobilation. May resume chemical prophylaxis if needed 5 days post-operative.  Heparin Sq may be

## 2019-10-23 NOTE — OCCUPATIONAL THERAPY NOTE
OCCUPATIONAL THERAPY EVALUATION - INPATIENT     Room Number: 596/956-Y  Evaluation Date: 10/23/2019  Type of Evaluation: Initial  Presenting Problem: L2 compression fx, s/p L1-3 stabilization, open treatment L2 compression fx and cemented augmentation L2 1 4/9/13 - MICHELLE Phillips    adenoma, diverticuli, hemorrhoids, repeat 2018.     • HIP REPLACEMENT SURGERY      bilateral    • INTRAOPERATIVE NEURO MONITORING N/A 10/22/2019    Performed by Indra Mejia MD at [facility] MAIN OR   • NASAL SEPTOPLASTY BILAT SINUS ENDOSCOPY [facility]    Fine Motor    WFL      ADDITIONAL TESTS                                    NEUROLOGICAL FINDINGS  Neurological Findings: None                ACTIVITY TOLERANCE                         O2 SATURATIONS                ACTIVITIES OF DAILY LIVING ASSESSME to next session as patient not up long enough to tolerate donning brace. Patient also educated on OT role, safety, fall prevention, pain management, discharge recommendations with good verbal understanding.  Increased time required to patient pain levels, n functioning below his previous functional level and would benefit from skilled inpatient OT to address the above deficits, maximizing patient’s ability to return safely to his prior level of function.     Patient Complexity  Occupational Profile/Medical His

## 2019-10-24 PROCEDURE — 97530 THERAPEUTIC ACTIVITIES: CPT

## 2019-10-24 PROCEDURE — 97116 GAIT TRAINING THERAPY: CPT

## 2019-10-24 PROCEDURE — 94640 AIRWAY INHALATION TREATMENT: CPT

## 2019-10-24 RX ORDER — MORPHINE SULFATE 15 MG/1
15 TABLET ORAL EVERY 6 HOURS
Status: DISCONTINUED | OUTPATIENT
Start: 2019-10-24 | End: 2019-10-26

## 2019-10-24 RX ORDER — OXYCODONE HYDROCHLORIDE 5 MG/1
5 TABLET ORAL EVERY 6 HOURS
Status: DISCONTINUED | OUTPATIENT
Start: 2019-10-24 | End: 2019-10-26

## 2019-10-24 RX ORDER — IPRATROPIUM BROMIDE AND ALBUTEROL SULFATE 2.5; .5 MG/3ML; MG/3ML
3 SOLUTION RESPIRATORY (INHALATION)
Status: DISCONTINUED | OUTPATIENT
Start: 2019-10-24 | End: 2019-10-26

## 2019-10-24 NOTE — PROGRESS NOTES
10/24/19 0913   Clinical Encounter Type   Visited With Health care provider;Patient  (JOSESITO Lux)   Routine Visit Follow-up    scanned the completed POLST form into patient's EMR. Handed over the original form to the patient.  Established a support

## 2019-10-24 NOTE — PLAN OF CARE
RECD EARLY AM WITH R.T TX. SEE VS AND MAR. PT ON O2 3LNC. ABD LARGE AND ROUNDED. PT STATES\" IM TIRED BEC THEY COME TO CHECK MY VS OFTEN. \" CLOSE MONITOR . SEE VS AFTER HYDRALAZINE. FROM 172/83 /77 P 86. WILL PAGE PMD TO SIGN POLST FORM. CALL LIGHT W/IN R

## 2019-10-24 NOTE — PHYSICAL THERAPY NOTE
PHYSICAL THERAPY TREATMENT NOTE - INPATIENT    Room Number: 900/636-C     Session: 1  Number of Visits to Meet Established Goals: 5    Presenting Problem: S/p L1-L2,L2-L3 Stabilization, Open treatment of L2 Compression Fx & Cement Augmentation L2 on 10/22 NEURO MONITORING N/A 10/22/2019    Performed by Stephen Holland MD at [facility] MAIN OR   • NASAL SEPTOPLASTY BILAT SINUS ENDOSCOPY ETHM MAX Bilateral 10/25/2016    Performed by Sha Michael MD at [facility]5 [facility]   • 190 [facility] ENT N/A 10/25/2016    Performed by Standardized Score (AM-PAC Scale): 40.78   CMS Modifier (G-Code): CK    FUNCTIONAL ABILITY STATUS  Gait Assessment   Gait Assistance: Maximum assistance(Actual assist - min)  Distance (ft): 50 ft  Assistive Device: Rolling walker  Pattern: Shuffle;R Decr functional independence prior to return home alone.   DISCHARGE RECOMMENDATIONS  PT Discharge Recommendations: Sub-acute rehabilitation(ELOS ; 10 to 14 days) The AM-PAC '6-Clicks' Inpatient Basic Mobility Short Form was completed and this patient is demonst

## 2019-10-24 NOTE — PROGRESS NOTES
[facility] Hospitalist Progress Note     Wanda Santana Patient Status:  Inpatient    1950 MRN [id number]   [facility] ([place], [place]) 3NE-A Attending Courtney Pastrana, DO   Hosp Day # 7 PCP Sayda Lomas MD     CC: follow up    SUBJECTIVE:   doing raheel Nebulization 2 times daily   • budesonide  0.5 mg Nebulization 2 times daily   • guaiFENesin ER  600 mg Oral Q6H   • lidocaine-menthol  1 patch Transdermal Nightly   • Fluticasone Propionate  1 spray Each Nare Daily   • Montelukast Sodium  10 mg Oral Night than 25 minutes spent in d/w pt/family, coordination of care, and/or d/w staff.      Jairo Yang [facility]  [phone]

## 2019-10-24 NOTE — PROGRESS NOTES
Reviewed prevention of constipation side effect  from narcotics. Teachback done again on ankle pumps and incentive spriometry use. Discharge education video begun for patient. Solicited and answered any questions pt had about care.  Pt verbalized Henning

## 2019-10-24 NOTE — OPERATIVE REPORT
[facility]    PATIENT'S NAME: Jennfier Leos   ATTENDING PHYSICIAN: Hema Berg MD   OPERATING PHYSICIAN: Talha Correa M.D.    PATIENT ACCOUNT#:   [de-identified]    LOCATION:  [address] [place] [address]  MEDICAL RECORD #:   [id number]       DATE OF BIRTH:  [birth year] through the subcutaneous tissue and fascia and then blunt dissection with finger in order to split the muscle fibers to access pedicle landmarks.   Jamshidis were then introduced under AP fluoroscopic image into L3 first and then into L1 followed by cannula

## 2019-10-24 NOTE — DIETARY NOTE
[facility] [facility] [address]     Admitting diagnosis:  Compression fracture of L2 vertebra, initial encounter ([facility].) [V96.308G]    Ht: 167.6 cm (5' 6\")  Wt: 77.1 kg (170 lb). This is 120% of IBW  Body mass index is 27.44 kg/m².   IBW:

## 2019-10-24 NOTE — PLAN OF CARE
Pt still has non-productive cough. Wheezing heard - on scheduled nebs and PRN inhaler. Cough syrup given. Weaned down to 2L of O2 via NC. Refused to get up d/t pain. Refused vitals @ 4am. Pt c/o right hip pain, MDs aware. Lidoderm patch applied.  Gauze x2 +

## 2019-10-24 NOTE — CM/SW NOTE
PT/OT evals and notes sent via [facility] to [facility] and [facility] rehab centers. Await response.      Lois Howe RN, [address] [place] [address]  Extension [phone]

## 2019-10-24 NOTE — PROGRESS NOTES
Pulmonary Progress Note        NAME: Dillan Garber - ROOM: 1678/4627-I - MRN: [id number] - Age: 80year old - : 1950        Last 24hrs: Pain is better controlled today, sleepy this AM as he didn't sleep much last night    OBJECTIVE:   10/24/19  0730 1 Labs     10/21/19  0630   WBC 9.1   HGB 16.2   MCV 96.1   .0       Recent Labs     10/21/19  0630      K 3.7      CO2 29.0   BUN 17   CA 9.7       No results for input(s): ALT, AST, ALB, AMYLASE, LIPASE, LDH in the last 72 hours.     Arlyce Lies

## 2019-10-24 NOTE — OCCUPATIONAL THERAPY NOTE
OCCUPATIONAL THERAPY TREATMENT NOTE - INPATIENT     Room Number: 217/615-R  Session:   Number of Visits to Meet Established Goals: 5    Presenting Problem: L2 compression fx, s/p L1-3 stabilization, open treatment L2 compression fx and cemented augmentatio INTRAOPERATIVE NEURO MONITORING N/A 10/22/2019    Performed by Yasmine Villafuerte MD at [facility] MAIN OR   • NASAL SEPTOPLASTY BILAT SINUS ENDOSCOPY ETHM MAX Bilateral 10/25/2016    Performed by Leah Braxton MD at [address] [place] [address]   • 190 [facility] ENT N/A 10/25/2016 approx 40 feet with min A. Patient able to transfer to recliner with good safety. O2 92% after walking on RA, nurse updated. Patient left with needs met, LEs elevated, brace in place.     Patient End of Session: Up in chair;Needs met;Call light within jennifer

## 2019-10-24 NOTE — PROGRESS NOTES
10/24/19 0913   Clinical Encounter Type   Visited With Health care provider  (JOSESITO Wyatt)   Responded to POLST consult. Encouraged JOSESITO Wyatt to page [phone] when the POLST form receive Physician/Practitioner's signature.

## 2019-10-25 ENCOUNTER — APPOINTMENT (OUTPATIENT)
Dept: GENERAL RADIOLOGY | Facility: HOSPITAL | Age: 69
DRG: 519 | End: 2019-10-25
Attending: ORTHOPAEDIC SURGERY
Payer: MEDICARE

## 2019-10-25 PROCEDURE — 97116 GAIT TRAINING THERAPY: CPT

## 2019-10-25 PROCEDURE — 94640 AIRWAY INHALATION TREATMENT: CPT

## 2019-10-25 PROCEDURE — 97530 THERAPEUTIC ACTIVITIES: CPT

## 2019-10-25 RX ORDER — GUAIFENESIN 600 MG
600 TABLET, EXTENDED RELEASE 12 HR ORAL 2 TIMES DAILY
Status: DISCONTINUED | OUTPATIENT
Start: 2019-10-25 | End: 2019-10-26

## 2019-10-25 NOTE — CM/SW NOTE
CM following up regarding [facility] choice. [facility]  is 1st choice, updates sent via [address]. Per Mona Juarez RN pt will not discharge today. Dicussed transportation, pt stating he will time to call his daughter 2 hours away.  CM informed RN to pass along in report to in

## 2019-10-25 NOTE — PROGRESS NOTES
[facility] Hospitalist Progress Note     Cora Parkinson Patient Status:  Inpatient    1950 MRN [id number]   [facility] 3NE-A Attending Missael Rendon, [address] [facility] Day # 8 PCP Clement [place] MD     CC: follow up    SUBJECTIVE:  Walked a lot budesonide  0.5 mg Nebulization 2 times daily   • lidocaine-menthol  1 patch Transdermal Nightly   • Fluticasone Propionate  1 spray Each Nare Daily   • Montelukast Sodium  10 mg Oral Nightly   • Umeclidinium Bromide  1 puff Inhalation Daily   • Fluticason

## 2019-10-25 NOTE — PLAN OF CARE
DOWN TO [facility] WITH TLSO BRACE. TECH CALLED , PT REFUSED TO STAND UP C/O TOO PAINFUL TO GET UP.

## 2019-10-25 NOTE — PLAN OF CARE
Pt received AOX4. Pain controlled with oral pain meds. Gauze x 2 + tegaderm C/D/I. Denies any numbness/tingling. Still c/o mid back and right hip pain - Lidoderm patch applied.  RN encouraged ambulation, encouraged getting out of bed before sleep however he

## 2019-10-25 NOTE — PROGRESS NOTES
Pulmonary Progress Note        NAME: Charmaine Gaxiola - ROOM: 9620/6275-I - MRN: [id number] - Age: 80year old - : 1950        Last 24hrs: getting a breathing treatment currently, trying not to cough due to pain, not using IS as he should, needing more O extremities normal, atraumatic, no cyanosis or edema      Recent Labs     10/21/19  0630   WBC 9.1   HGB 16.2   MCV 96.1   .0       Recent Labs     10/21/19  0630      K 3.7      CO2 29.0   BUN 17   CA 9.7       No results for input(s):

## 2019-10-25 NOTE — PLAN OF CARE
RECD ALERT ,AWAKE, ORIENTED X4. PT NEEDS ENCOURAGEMENT TO INCREASE ACTIVITY. PT AWARE OF PLAN OF CARE. PT AGREE FOR DULCOLAX SUPP. PT AWARE OF P.T. O.T. AND NEEDS TO GET OOB TO PREVENT SX COMPLICATIONS. CALL LIGHT W/IN REACH.  PT REQ FOR R.T [address] Amaro

## 2019-10-25 NOTE — PLAN OF CARE
BACK TO ROOM FROM Mariela Hernandez. PT STATES\" I CANT STAND TOO LONG I WALK TOO MUCH THIS AM.\" WILL PAGE DR URBANO TO INFORMED.  ABOVE

## 2019-10-25 NOTE — OCCUPATIONAL THERAPY NOTE
OCCUPATIONAL THERAPY                     Patient declined OT treatment , he was unable to stand up at imaging attempt due to R hip pain and walked three times earlier today. Reports he knows how to dress himself with equipment already.   Will at

## 2019-10-25 NOTE — PHYSICAL THERAPY NOTE
PHYSICAL THERAPY TREATMENT NOTE - INPATIENT    Room Number: 742/044-O     Session: 2  Number of Visits to Meet Established Goals: 5    Presenting Problem: S/p L1-L2,L2-L3 Stabilization, Open treatment of L2 Compression Fx & Cement Augmentation L2 on 10/22 NEURO MONITORING N/A 10/22/2019    Performed by Emily Ramírez MD at [facility] MAIN OR   • NASAL SEPTOPLASTY BILAT SINUS ENDOSCOPY ETHM MAX Bilateral 10/25/2016    Performed by Esther Quinones MD at [facility]5 [facility]   • 190 [facility] ENT N/A 10/25/2016    Performed by a railing?: Total       AM-PAC Score:  Raw Score: 16   Approx Degree of Impairment: 54.16%   Standardized Score (AM-PAC Scale): 40.78   CMS Modifier (G-Code): CK    FUNCTIONAL ABILITY STATUS  Gait Assessment   Gait Assistance: Minimum assistance  Distance management. Patient continues to function below baseline. Will continue to benefit by PT to maximize functional independence prior to return home alone.   DISCHARGE RECOMMENDATIONS  PT Discharge Recommendations: Sub-acute rehabilitation(ELOS : 10 to 14 days

## 2019-10-25 NOTE — CM/SW NOTE
Spoke with Mariama from [facility] ([phone]) and Merly Yates from [facility] ([phone]) - both facilities willing to accept pt for admission. Waiting on medical clearance for DC.   SW to follow up with pt regarding preferred facility given both

## 2019-10-26 VITALS
RESPIRATION RATE: 18 BRPM | SYSTOLIC BLOOD PRESSURE: 130 MMHG | BODY MASS INDEX: 27.32 KG/M2 | OXYGEN SATURATION: 94 % | HEART RATE: 83 BPM | WEIGHT: 170 LBS | DIASTOLIC BLOOD PRESSURE: 79 MMHG | TEMPERATURE: 99 F | HEIGHT: 66 IN

## 2019-10-26 PROCEDURE — 94640 AIRWAY INHALATION TREATMENT: CPT

## 2019-10-26 PROCEDURE — 97116 GAIT TRAINING THERAPY: CPT

## 2019-10-26 PROCEDURE — 97530 THERAPEUTIC ACTIVITIES: CPT

## 2019-10-26 RX ORDER — PSEUDOEPHEDRINE HCL 30 MG
100 TABLET ORAL 2 TIMES DAILY
Qty: 60 CAPSULE | Refills: 0 | Status: SHIPPED | OUTPATIENT
Start: 2019-10-26 | End: 2020-11-06

## 2019-10-26 RX ORDER — BUDESONIDE 0.5 MG/2ML
0.5 INHALANT ORAL 2 TIMES DAILY
Qty: 60 CONTAINER | Refills: 0 | Status: SHIPPED | OUTPATIENT
Start: 2019-10-26 | End: 2020-03-18

## 2019-10-26 RX ORDER — PREGABALIN 50 MG/1
50 CAPSULE ORAL 2 TIMES DAILY
Qty: 60 CAPSULE | Refills: 0 | Status: SHIPPED | OUTPATIENT
Start: 2019-10-26

## 2019-10-26 RX ORDER — OXYCODONE HYDROCHLORIDE 5 MG/1
5 TABLET ORAL EVERY 6 HOURS
Qty: 20 TABLET | Refills: 0 | Status: SHIPPED | OUTPATIENT
Start: 2019-10-26 | End: 2020-06-19 | Stop reason: ALTCHOICE

## 2019-10-26 RX ORDER — ARFORMOTEROL TARTRATE 15 UG/2ML
15 SOLUTION RESPIRATORY (INHALATION) 2 TIMES DAILY
Qty: 60 ML | Refills: 0 | Status: SHIPPED | OUTPATIENT
Start: 2019-10-26 | End: 2020-03-18

## 2019-10-26 RX ORDER — MORPHINE SULFATE 15 MG/1
15 TABLET ORAL EVERY 8 HOURS PRN
Qty: 20 TABLET | Refills: 0 | Status: SHIPPED | OUTPATIENT
Start: 2019-10-26 | End: 2020-08-21

## 2019-10-26 NOTE — PLAN OF CARE
POD 4 L1-L3 stabilization, Pt AAOX4, VSS, [place], using 2L O2 overnight, Pt still \"wheezy\". [place]in, unproductive cough, PT is DNR, has scheduled NEBs, and cough meds ordered. Pt has scheduled pain meds.  Pt seems comfortable and still claims pain 9/10 at rest.

## 2019-10-26 NOTE — PROGRESS NOTES
[place] Patient Status:  Inpatient    1950 MRN [id number]   [facility] 3SW-A Attending Darrin Pearson, [address] Day # 9 PCP Hollie Cardoza MD        SUBJECTIVE:Feels better.      OBJECTIVE:  Patient Vitals

## 2019-10-26 NOTE — CM/SW NOTE
CM informed that pt is ready for discharge today. [facility] has bed available today and can accept pt at 1PM.   RN to call report to # [phone]. Family to transport to facility. If Medicar needed, RN will notify CM Pager # [phone].     Stephen Rome,

## 2019-10-26 NOTE — PROGRESS NOTES
[facility] Hospitalist Progress Note     Chantelle Leigh Patient Status:  Inpatient    1950 MRN [id number]   [facility] 3NE-A Attending Bienvenido Landa, [address] [facility] Day # 9 PCP Michael Carpenter MD     CC: follow up    SUBJECTIVE:  Doing OK • Fluticasone Propionate  1 spray Each Nare Daily   • Montelukast Sodium  10 mg Oral Nightly   • Umeclidinium Bromide  1 puff Inhalation Daily   • Fluticasone Propionate  2 spray Each Nare Daily   • Senna  8.6 mg Oral Daily   • famoTIDine  20 mg Oral BID

## 2019-10-26 NOTE — PROGRESS NOTES
NURSING DISCHARGE NOTE    Discharged Nursing home via Wheelchair. Accompanied by Family member  Belongings Taken by patient/family   [facility] to [facility], daughter transported patient in a private vehicle.   Report called in to [employer], t

## 2019-10-26 NOTE — PLAN OF CARE
Patient up in the chair with LSO brace on, ambulated in the martinez using a walker SBA. Lower back incision with dressing clean, dry and intact. Patient awake, alert and oriented x 4, at room air  with oxygen saturation between 89-92 %.   Occasional non-prod

## 2019-10-26 NOTE — PHYSICAL THERAPY NOTE
PHYSICAL THERAPY TREATMENT NOTE - INPATIENT    Room Number: 680/835-A     Session: 3  Number of Visits to Meet Established Goals: 5    Presenting Problem: S/p L1-L2,L2-L3 Stabilization, Open treatment of L2 Compression Fx & Cement Augmentation L2 on 10/22 NEURO MONITORING N/A 10/22/2019    Performed by Gustavo Downs MD at [facility] MAIN OR   • NASAL SEPTOPLASTY BILAT SINUS ENDOSCOPY ETHM MAX Bilateral 10/25/2016    Performed by Marely Urrutia MD at [facility]   • 190 [facility] ENT N/A 10/25/2016    Performed by Modifier (G-Code): CK    FUNCTIONAL ABILITY STATUS  Gait Assessment   Gait Assistance: Contact guard assist  Distance (ft): 150  Assistive Device: Rolling walker  Pattern: Shuffle;R Decreased stance time(Heavy dependence on RW,Needed O2 via nc @ 3 lt)  Sto Recommendations: Sub-acute rehabilitation(ELOS : 10 to 14 days) The AM-PAC '6-Clicks' Inpatient Basic Mobility Short Form was completed and this patient is demonstrating a 54.16% degree of impairment in mobility.  Research supports that patients with this l

## 2019-10-27 NOTE — CM/SW NOTE
Patient discharged on 10/26 as previously planned.        10/27/19 1400   Discharge disposition   Expected discharge disposition Skilled Nurs   Name of Facillity/Home Care/Hospice   ([facility])   Discharge transportation Private car

## 2019-10-28 ENCOUNTER — TELEPHONE (OUTPATIENT)
Dept: MEDSURG UNIT | Facility: HOSPITAL | Age: 69
End: 2019-10-28

## 2019-11-01 NOTE — DISCHARGE SUMMARY
General Medicine Discharge Summary     Patient ID:  Carlos Mueller  80year old  12/6/1950    Admit date: 10/17/2019    Discharge date and time:  10/26/19    Attending Physician: No att. providers found recs  - cont home asthma meds  - mucinex, hypertonic saline, flutter valve, nebs     # Elevated BP  - no known hx HTN, f/u with pcp  - PRN hydralazine     PPX: SCDs  Code: pt is [facility]     DISPO: stable for DC to [facility]    Consults: IP CONSULT TO ORTHO SPINE  I R-2    albuterol sulfate (2.5 MG/3ML) 0.083% Inhalation Nebu Soln  Take 3 mL (2.5 mg total) by nebulization daily as needed for Wheezing.  PART B - DX CODE J47.9, Fax, [id number] vial, R-0    Albuterol Sulfate  (90 Base) MCG/ACT Inhalation Aero Soln  I

## 2020-05-14 PROBLEM — G47.33 OSA (OBSTRUCTIVE SLEEP APNEA): Status: ACTIVE | Noted: 2020-05-14

## 2021-06-30 PROBLEM — N32.81 OAB (OVERACTIVE BLADDER): Status: ACTIVE | Noted: 2021-06-30

## 2021-07-14 ENCOUNTER — HOSPITAL ENCOUNTER (OUTPATIENT)
Dept: LAB | Facility: HOSPITAL | Age: 71
Discharge: HOME | End: 2021-07-14
Attending: UROLOGY
Payer: MEDICARE

## 2021-07-14 DIAGNOSIS — R97.20 ELEVATED PSA: ICD-10-CM

## 2021-07-14 LAB
PSA FREE MFR SERPL: 8 %
PSA FREE SERPL-MCNC: 0.59 NG/ML
PSA SERPL-MCNC: 7.61 NG/ML (ref ?–4)

## 2021-07-14 PROCEDURE — 36415 COLL VENOUS BLD VENIPUNCTURE: CPT | Performed by: UROLOGY

## 2021-07-14 PROCEDURE — 84154 ASSAY OF PSA FREE: CPT | Performed by: UROLOGY

## 2021-07-14 PROCEDURE — 84153 ASSAY OF PSA TOTAL: CPT | Performed by: UROLOGY

## 2021-09-01 PROBLEM — J44.89 CHRONIC OBSTRUCTIVE ASTHMA: Status: ACTIVE | Noted: 2021-09-01

## 2021-09-01 PROBLEM — I27.20 PULMONARY HYPERTENSION (HCC): Status: ACTIVE | Noted: 2021-09-01

## 2021-09-01 PROBLEM — J44.9 CHRONIC OBSTRUCTIVE ASTHMA (HCC): Status: ACTIVE | Noted: 2021-09-01

## 2021-11-22 NOTE — PLAN OF CARE
FULLY AWAKE, PT AWARE PLAN OF CARE . WILL BE UP WITH P.T. O.T TO CHAIR.  PT AMBULATE WITH P.T. O.,T. NEED ENCOURAGEMENT TO INCREASE ACTIVITY Patient wanted to give the office the fax number to where her referral for her sleep apnea needs to go.   Please find below       [facility] ·   Fax: [phone]

## 2021-12-07 NOTE — CM/SW NOTE
10/23/19 1100   CM/SW Referral Data   Referral Source Social Work (self-referral)   Reason for Referral Discharge planning;Psychoscial assessment   Informant Patient   Patient Info   Patient's Mental Status Alert;Oriented   Patient's [address] Patient here for follow up and evaluation.  Patient would like A1c checked.  Patient has diabetes hypertension, blood pressure elevated, will increase losartan.  Patient will check blood pressure at home.  I discussed the patient's case with the resident. I agree with the resident's finding and plan, as documented in today's note.     Shen Chandler M.D.

## 2022-01-05 PROBLEM — E21.3 HYPERPARATHYROIDISM (HCC): Status: ACTIVE | Noted: 2022-01-05

## (undated) DEVICE — DRAPE C-ARM UNIVERSAL

## (undated) DEVICE — COVER,MAYO STAND,STERILE: Brand: MEDLINE

## (undated) DEVICE — 3M™ IOBAN™ 2 ANTIMICROBIAL INCISE DRAPE 6650EZ: Brand: IOBAN™ 2

## (undated) DEVICE — PROXIMATE RH ROTATING HEAD SKIN STAPLERS (35 WIDE) CONTAINS 35 STAINLESS STEEL STAPLES: Brand: PROXIMATE

## (undated) DEVICE — GOWN SURG AERO CHROME XXL

## (undated) DEVICE — DRAPE TABLE COVER 44X90 TC-10

## (undated) DEVICE — MAXCESS MAS TLIF 2 KIT ACCESS

## (undated) DEVICE — NVM5 PROBE SNGL USE STER PKG

## (undated) DEVICE — Device

## (undated) DEVICE — STERILE POLYISOPRENE POWDER-FREE SURGICAL GLOVES: Brand: PROTEXIS

## (undated) DEVICE — SUTURE VICRYL 1 CT-1

## (undated) DEVICE — LIGHT HANDLE

## (undated) DEVICE — TRANSPOSAL ULTRAFLEX DUO/QUAD ULTRA CART MANIFOLD

## (undated) DEVICE — THE MILL DISPOSABLE - FINE

## (undated) DEVICE — FLOSEAL HEMOSTATIC MATRIX, 5ML: Brand: FLOSEAL HEMOSTATIC MATRIX

## (undated) DEVICE — WRAP COOLING BACK W/NO PILLOW

## (undated) DEVICE — VIOLET BRAIDED (POLYGLACTIN 910), SYNTHETIC ABSORBABLE SUTURE: Brand: COATED VICRYL

## (undated) DEVICE — SOL  .9 1000ML BTL

## (undated) DEVICE — LAMINECTOMY CDS: Brand: MEDLINE INDUSTRIES, INC.

## (undated) DEVICE — CAUTERY BLADE 2IN INS E1455

## (undated) DEVICE — CHLORAPREP 26ML APPLICATOR

## (undated) DEVICE — 11.1-M5 MULTIMODALITY KIT 5

## (undated) DEVICE — CEMENT PUSHER 5.0-5.8MM: Brand: SPINEJACK

## (undated) DEVICE — KENDALL SCD EXPRESS SLEEVES, KNEE LENGTH, MEDIUM: Brand: KENDALL SCD

## (undated) DEVICE — DERMABOND LIQUID ADHESIVE

## (undated) DEVICE — 3.0MM NEURO (MATCH HEAD) SOFT TOUCH

## (undated) NOTE — IP AVS SNAPSHOT
Patient Demographics     Address  [place] [id number] Phone  [phone] [facility])  [phone] (Mobile) *Preferred* E-mail Address  [email or web address]      Emergency Contact(s)     Name Relation Home Work Mobile    Luz Soto ? Fatigue is common after surgery and you may find respite in sitting. This is normal and encouraged. Please remember to be deliberately mobile throughout the day. Change your position frequently, as your muscles may get sore and stiff without movement. ? If you have been provided a brace or corset  o Use when out of bed except when showering.  o You may wear even when toileting.  o Anticipate wearing for 6-12 weeks after surgery; exact time to be determined by surgeon.    o Apply/remove your brace when si not require reapplication of the adhesive dressing. You may leave your wound open to air or cover with gauze and paper tape after showers. ? Always wash hands before and after dressing changes. ?  Watch incision for any worsening redness, increased drai ? Even one cigarette a day may cause problems. ? Vaping, chewing tobacco, nicotine gum and patches will also inhibit healing. Pain Management  Expectations  ? You will have incisional site pain. This is normal and expected after surgery.    ? You may co ? Tylenol (acetaminophen) is another excellent medication for pain. Take caution as most narcotics contain acetaminophen. You may take a combined daily maximum 4 grams (4000mg) of Tylenol (acetaminophen) in 24 hours.  More than this can lead to liver injury ? New numbness or weakness to arms or legs. ? Difficulty urinating or if incontinence of your bowel. ? Headaches that worsen when standing/sitting and resolve when laid flat. ? Any concerns, unanswered questions, or new problems.      Go directly to the Yola Aleman IV, MD         budesonide 0.5 MG/2ML Susp  Commonly known as:  PULMICORT  Next dose due: Tonight       Take 2 mL (0.5 mg total) by nebulization 2 (two) times daily.    Grayson Aldrich MD         diphenhydrAMINE HCl 25 MG Tabs  Commonly k Shannan Alejandra MD         sodium chloride 3 % Nebu  Next dose due:  TONIGHT      Take 4 mL by nebulization every 12 (twelve) hours. Marylu Ge MD         TAB-A-JENNIFER Tabs  Next dose due:   Tomorrow morning      Take 1 tablet by mouth da [id number] docusate sodium (COLACE) cap 100 mg 10/25/19 2145 Given      [id number] docusate sodium (COLACE) cap 100 mg 10/26/19 0902 Given      [id number] famoTIDine (PEPCID) tab 20 mg 10/25/19 2142 Given      [id number] famoTIDine (PEPCID) tab 20 mg 10/26/19 SpO2  94 % Filed at 10/26/2019 1130      Patient's Most Recent Weight       Most Recent Value   Patient Weight  77.1 kg (170 lb)      Lab Results Last 24 Hours    No matching results found     Microbiology Results (All)     Procedure Component Value Units • Depression 2/00/9678   • Eosinophilic asthma ([facility]    • Family history of colon cancer - brother 4/23/2014   • History of chronic sinusitis 8/30/2017   • 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 DiphenhydrAMINE HCl 25 MG Oral Tab, Take 25 mg by mouth every 6 (six) hours as needed.     Triamcinolone Acetonide (NASACORT ALLERGY 24HR) 55 MCG/ACT Nasal Aerosol, 1 spray b Nose: Nares normal,  Mucosa normal    Throat: Lips, mucosa, and tongue normal   Neck: Supple, symmetrical, trachea midline   Lungs:   Clear to auscultation bilaterally.  Normal effort   Chest wall:  No tenderness or deformity   Heart:  Regular rate and rhyt superimposed infectious process not excluded.     Dictated by: Isaura Scherer MD on 10/17/2019 at 13:47     Approved by: Isaura Scherer MD on 10/17/2019 at 13:48          Xr Lumbar Spine (min 4 Views) (cpt=72110)    Result Date: 10/14/2019  DATE OF SE Result Date: 10/14/2019  DATE OF SERVICE: 10.14.2019 RIGHT HIP AP AND LATERAL HISTORY/INDICATION:  Unspecified fall, initial encounter FINDINGS: No definite acute fracture or dislocation seen. No definite acute bony abnormality identified.  Total right hip asthma, bronchiectasis, GERD, chronic pain and AVN who presented on 10/17 with complaints of back pain following fall at home. Following admission imaging was consistent with burst fracture of L2 with posterior wall involvement.   Surgery was consulted and • TOTAL HIP REPLACEMENT Bilateral         Radiology Contrast *    ANAPHYLAXIS, HIVES  Prednisone              OTHER (SEE COMMENTS)    Comment:Avascular necrosis     Social History:   reports that he quit smoking about 46 years ago.  His smoking use included Take 3 mL by nebulization every 6 (six) hours as needed. , Disp: 200 vial, Rfl: 3  Tiotropium Bromide Monohydrate (SPIRIVA RESPIMAT) 2.5 MCG/ACT Inhalation Aero Soln, Inhale 2 Inhalers into the lungs daily. , Disp: 3 Inhaler, Rfl: 3  Azelastine HCl 0.1 % Bennett •  lidocaine-menthol 4-1 % 1 patch, 1 patch, Transdermal, Nightly  •  albuterol sulfate (VENTOLIN) (2.5 MG/3ML) 0.083% nebulizer solution 2.5 mg, 2.5 mg, Nebulization, Daily PRN  •  Albuterol Sulfate  (90 Base) MCG/ACT inhaler 2 puff, 2 puff, Inhala Allergic/immunologic: denies hay fever, hives, or new allergies       OBJECTIVE:  Patient Vitals for the past 24 hrs:   BP Temp Temp src Pulse Resp SpO2   10/20/19 0811 — — — — — 96 %   10/20/19 0550 146/77 — — 65 — 95 %   10/20/19 0524 (!) 163/93 97.9 °F -will repeat CXR now to ensure there is no acute infectious process that needs to be addressed prior to surgery.   Procalcitonin and sputum culture also ordered[KW.2]  -There are NO absolute contraindications to proceeding with surgical intervention   -Leonela :  Gloria Levy PT (Physical Therapist)        PHYSICAL THERAPY TREATMENT NOTE - INPATIENT    Room Number: 920/777-B     Session: 2  Number of Visits to Meet Established Goals: 5    Presenting Problem: S/p L1-L2,L2-L3 Stabilization, Open treatm • HIP REPLACEMENT SURGERY      bilateral    • INTRAOPERATIVE NEURO MONITORING N/A 10/22/2019    Performed by Marv Michele MD at [facility] MAIN OR   • NASAL SEPTOPLASTY BILAT SINUS ENDOSCOPY ETHM MAX Bilateral 10/25/2016    Performed by Justus Melendez MD at [address] [facility] -   Need to walk in hospital room?: A Little   -   Climbing 3-5 steps with a railing?: Total       AM-PAC Score:  Raw Score: 16   Approx Degree of Impairment: 54.16%   Standardized Score (AM-PAC Scale): 40.78   CMS Modifier (G-Code): CK    FUNCTIONAL ABILI assist for short distance. Pain @ surgical site + right hip limited further progress. Patient was dependent for TLSO management. Patient continues to function below baseline.  Will continue to benefit by PT to maximize functional independence prior to retur :  Genoveva Castellano, PT (Physical Therapist)        PHYSICAL THERAPY TREATMENT NOTE - INPATIENT    Room Number: 367/286-K     Session: 1  Number of Visits to Meet Established Goals: 5    Presenting Problem: S/p L1-L2,L2-L3 Stabilization, Open treatm • HIP REPLACEMENT SURGERY      bilateral    • INTRAOPERATIVE NEURO MONITORING N/A 10/22/2019    Performed by Marv Michele MD at [facility] MAIN OR   • NASAL SEPTOPLASTY BILAT SINUS ENDOSCOPY ETHM MAX Bilateral 10/25/2016    Performed by Justus Melendez MD at [address] [facility] Raw Score: 16   Approx Degree of Impairment: 54.16%   Standardized Score (AM-PAC Scale): 40.78   CMS Modifier (G-Code): CK    FUNCTIONAL ABILITY STATUS  Gait Assessment   Gait Assistance: Maximum assistance(Actual assist - min)  Distance (ft): 50 ft  [employer] continues to function below baseline. Will continue to benefit by PT to maximize functional independence prior to return home alone.   DISCHARGE RECOMMENDATIONS  PT Discharge Recommendations: Sub-acute rehabilitation(ELOS ; 10 to 14 days) The AM-PAC '6-Clic :  Shila Pimentel, OT (Occupational Therapist)                   OCCUPATIONAL THERAPY                     Patient declined OT treatment , he was unable to stand up at imaging attempt due to R hip pain and walked three times earlier today.  Reports bilateral knees   • AVN (avascular necrosis of bone) - shoulder, hips, knees 12/4/2012   • [facility]    • Chronic pain syndrome 01/08/2009   • Depression 8/19/1228   • Eosinophilic [facility] ([facility]    • Esophageal reflux    • Family history of colo -   Bathing (including washing, rinsing, drying)?: A Lot  -   Toileting, which includes using toilet, bedpan or urinal? : A Lot  -   Putting on and taking off regular upper body clothing?: A Little  -   Taking care of personal grooming such as brushing vijay Patient will perform Toileting with min assist     FUNCTIONAL TRANSFER GOALS   Patient will transfer to toilet with min assist     ADDITIONAL GOALS  Patient will recall all spinal precautions and incorporate into ADLs  Patient will don/doff brace with min • Avascular necrosis ([facility])     bilateral knees   • AVN (avascular necrosis of bone) - shoulder, hips, knees 12/4/2012   • [facility]    • Chronic pain syndrome 01/08/2009   • Depression 0/70/9197   • Eosinophilic asthma ([facility]    • Esophageal refl WEIGHT BEARING RESTRICTION  Weight Bearing Restriction: None                PAIN ASSESSMENT  Rating: 10  Location: low back, radiating up  Management Techniques: Activity promotion; Body mechanics;Breathing techniques;Relaxation;Repositioning    COGNITION importance of movement after spine surgery; education on spinal precautions, body mechanics, back protection principles, and incorporation into ADLs and functional transfers; patient required min cueing to follow throughout session; education on bed mobili lower body dressing/bathing, toileting, toilet transfers, bed mobility, functional mobility, instrumental activities of daily living, rest and sleep, work, leisure and social participation.  Results of the AM-PAC \"6 clicks\" Inpatient Activities of Daily L Patient will perform Toileting with min assist    FUNCTIONAL TRANSFER GOALS   Patient will transfer to Toilet with min assist    ADDITIONAL GOALS  Patient will recall all spinal precautions and incorporate into ADLs  Patient will don/doff brace with min as

## (undated) NOTE — IP AVS SNAPSHOT
[address]            (For Outpatient Use Only) Initial Admit Date: 10/17/2019   Inpt/Obs Admit Date: Inpt: 10/17/19 / Obs: N/A   Discharge Date:    Jenni Brandt:  [de-identified]   MRN: [de-identified]   CSN: [id number]   CEID: [id number] Hospital Account Financial Class: Medicare    October 26, 2019